# Patient Record
Sex: FEMALE | Race: BLACK OR AFRICAN AMERICAN
[De-identification: names, ages, dates, MRNs, and addresses within clinical notes are randomized per-mention and may not be internally consistent; named-entity substitution may affect disease eponyms.]

---

## 2017-07-28 ENCOUNTER — HOSPITAL ENCOUNTER (EMERGENCY)
Dept: HOSPITAL 61 - ER | Age: 37
Discharge: HOME | End: 2017-07-28
Payer: COMMERCIAL

## 2017-07-28 VITALS — SYSTOLIC BLOOD PRESSURE: 155 MMHG | DIASTOLIC BLOOD PRESSURE: 97 MMHG

## 2017-07-28 VITALS — WEIGHT: 240 LBS | HEIGHT: 66 IN | BODY MASS INDEX: 38.57 KG/M2

## 2017-07-28 DIAGNOSIS — K12.1: Primary | ICD-10-CM

## 2017-07-28 DIAGNOSIS — E78.00: ICD-10-CM

## 2017-07-28 DIAGNOSIS — F12.10: ICD-10-CM

## 2017-07-28 DIAGNOSIS — I10: ICD-10-CM

## 2017-07-28 DIAGNOSIS — G89.29: ICD-10-CM

## 2017-07-28 DIAGNOSIS — Z88.2: ICD-10-CM

## 2017-07-28 PROCEDURE — 99282 EMERGENCY DEPT VISIT SF MDM: CPT

## 2017-07-28 PROCEDURE — 99283 EMERGENCY DEPT VISIT LOW MDM: CPT

## 2017-07-28 NOTE — PHYS DOC
Past Medical History


Past Medical History:  No Pertinent History, High Cholesterol, Hypertension


Additional Past Medical Histor:  chronic back pain & chronic leg pain from 

mechanical falls


Past Surgical History:  No Surgical History


Alcohol Use:  Occasionally


Drug Use:  Marijuana





Adult General


Chief Complaint


Chief Complaint:  OTHER COMPLAINTS





[facility]





Patient is a 36  year old female with history of hypertension high cholesterol 

who presents today with soles on her thigh that began today. Patient denies any 

fever.





Review of Systems


Review of Systems





Constitutional: Denies fever or chills []


Eyes: Denies change in visual acuity, redness, or eye pain []


HENT: Sores on her tongue


Respiratory: Denies cough or shortness of breath []


Cardiovascular: No additional information not addressed in [facility] []


GI: Denies abdominal pain, nausea, vomiting, bloody stools or diarrhea []


: Denies dysuria or hematuria []


Musculoskeletal: Denies back pain or joint pain []


Integument: Denies rash or skin lesions []


Neurologic: Denies headache, focal weakness or sensory changes []


Endocrine: Denies polyuria or polydipsia []





Current Medications


Current Medications





Current Medications








 Medications


  (Trade)  Dose


 Ordered  Sig/Kurtis  Start Time


 Stop Time Status Last Admin


Dose Admin


 


 Lidocaine HCl


  (Viscous


 Lidocaine)  15 ml  1X  ONCE  7/28/17 22:15


 7/28/17 22:16 UNV  


 











Allergies


Allergies





Allergies








Coded Allergies Type Severity Reaction Last Updated Verified


 


  Sulfa (Sulfonamide Antibiotics) Allergy Unknown  12/27/16 Yes











Physical Exam


Physical Exam





Constitutional: Well developed, well nourished, no acute distress, non-toxic 

appearance. []


HENT: Normocephalic, atraumatic, bilateral external ears normal, oropharynx 

moist, no oral exudates, nose normal. []


tip of the tongue with small amount of stomatitis lesions


Eyes: PERRLA, EOMI, conjunctiva normal, no discharge. [] 


Neck: Normal range of motion, no tenderness, supple, no stridor. [] 


Cardiovascular:Heart rate regular rhythm, no murmur []


Lungs & Thorax:  Bilateral breath sounds clear to auscultation []


Abdomen: Bowel sounds normal, soft, no tenderness, no masses, no pulsatile 

masses. [] 


Skin: Warm, dry, no erythema, no rash. [] 


Back: No tenderness, no CVA tenderness. [] 


Extremities: No tenderness, no cyanosis, no clubbing, ROM intact, no edema. [] 


Neurologic: Alert and oriented X 3, normal motor function, normal sensory 

function, no focal deficits noted. []


Psychologic: Affect normal, judgement normal, mood normal. []





Current Patient Data


Vital Signs





 Vital Signs








  Date Time  Temp Pulse Resp B/P (MAP) Pulse Ox O2 Delivery O2 Flow Rate FiO2


 


7/28/17 22:05 97.8 87 16  100 Room Air  





 97.8       











EKG


EKG


[]





Radiology/Procedures


Radiology/Procedures


[]





Course & Med Decision Making


Course & Med Decision Making


Pertinent Labs and Imaging studies reviewed. (See chart for details)





Patient has oral stomatitis. Discharged in much mouthwash. Follow-up with 

primary care doctor in 1-2 weeks.





Dragon Disclaimer


Dragon Disclaimer


This electronic medical record was generated, in whole or in part, using a 

voice recognition dictation system.





Departure


Departure


Impression:  


 Primary Impression:  


 Stomatitis


Disposition:  01 HOME, SELF-CARE


Condition:  STABLE


Referrals:  


LUCIEN SWAN MD (PCP)


follow up with your doctor in 1 week


Patient Instructions:  Stomatitis, Easy-to-Read





Additional Instructions:  


You have stomatitis. Please use the medication provided as needed. Please 

follow up with your doctor in one week.











LAURA SOSA Jul 28, 2017 22:16

## 2019-06-24 ENCOUNTER — HOSPITAL ENCOUNTER (EMERGENCY)
Dept: HOSPITAL 61 - ER | Age: 39
Discharge: HOME | End: 2019-06-24
Payer: MEDICAID

## 2019-06-24 VITALS — HEIGHT: 66 IN | BODY MASS INDEX: 38.57 KG/M2 | WEIGHT: 240 LBS

## 2019-06-24 VITALS — DIASTOLIC BLOOD PRESSURE: 97 MMHG | SYSTOLIC BLOOD PRESSURE: 163 MMHG

## 2019-06-24 DIAGNOSIS — Z76.0: Primary | ICD-10-CM

## 2019-06-24 DIAGNOSIS — E78.00: ICD-10-CM

## 2019-06-24 DIAGNOSIS — Z88.2: ICD-10-CM

## 2019-06-24 DIAGNOSIS — I10: ICD-10-CM

## 2019-06-24 DIAGNOSIS — G89.29: ICD-10-CM

## 2019-06-24 PROCEDURE — 99283 EMERGENCY DEPT VISIT LOW MDM: CPT

## 2019-06-24 NOTE — PHYS DOC
Past Medical History


Past Medical History:  No Pertinent History, High Cholesterol, Hypertension


Additional Past Medical Histor:  chronic back pain & chronic leg pain from 

mechanical falls


Past Surgical History:  No Surgical History


Alcohol Use:  Occasionally


Drug Use:  Marijuana





Adult General


Chief Complaint


Chief Complaint:  LOWER EXTREMITY SWELLING





HPI


HPI





Patient is a 38  year old female presents to the ED for medication refill. 

Patient states she woke up today and had swelling to her body. States she drank 

alcohol last night. Patient has no swelling on exam. Patient has a history of 

hypertension and has not been taking her medication because she is out of it. 

States her insurance ran out and she has not been to the doctor in 2 months. 

Requesting medication refill. Denies chest pain, shortness of breath, weakness, 

orthopnea, abdominal pain, nausea/vomiting, fever, headache, dizziness.





Review of Systems


Review of Systems





Constitutional: Denies fever or chills []


Eyes: Denies change in visual acuity, redness, or eye pain []


HENT: Denies nasal congestion or sore throat []


Respiratory: Denies cough or shortness of breath []


Cardiovascular: No additional information not addressed in HPI []


GI: Denies abdominal pain, nausea, vomiting, bloody stools or diarrhea []


: Denies dysuria or hematuria []


Musculoskeletal: Denies back pain or joint pain []


Integument: Denies rash or skin lesions []


Neurologic: Denies headache, focal weakness or sensory changes []


Endocrine: Denies polyuria or polydipsia []





All other systems were reviewed and found to be within normal limits, except as 

documented in this note.





Allergies


Allergies





Allergies








Coded Allergies Type Severity Reaction Last Updated Verified


 


  Sulfa (Sulfonamide Antibiotics) Allergy Unknown  12/27/16 Yes











Physical Exam


Physical Exam





Constitutional: Well developed, well nourished, no acute distress, non-toxic 

appearance. []


HENT: Normocephalic, atraumatic


Eyes: PERRLA, EOMI, conjunctiva normal, no discharge. [] 


Neck: Normal range of motion, no tenderness, supple, no stridor. [] 


Cardiovascular:Heart rate regular rhythm, no murmur []


Lungs & Thorax:  Bilateral breath sounds clear to auscultation []


Abdomen: Bowel sounds normal, soft, no tenderness, no masses, no pulsatile 

masses. [] 


Skin: Warm, dry, no erythema, no rash. [] 


Back: No tenderness, no CVA tenderness. [] 


Extremities: No tenderness, no cyanosis, no clubbing, ROM intact, no edema. [] 


Neurologic: Alert and oriented X 3, normal motor function, normal sensory 

function, no focal deficits noted. []


Psychologic: Affect normal, judgement normal, mood normal. []





Current Patient Data


Vital Signs





                                   Vital Signs








  Date Time  Temp Pulse Resp B/P (MAP) Pulse Ox O2 Delivery O2 Flow Rate FiO2


 


6/24/19 08:48 98.2 87 20 163/97 (119) 99 Room Air  





 98.2       











EKG


EKG


[]





Radiology/Procedures


Radiology/Procedures


[]





Course & Med Decision Making


Course & Med Decision Making


Pertinent Labs and Imaging studies reviewed. (See chart for details)





[]Will restart patient on her home medications. Patient takes HCTZ 25 mg daily. 

Patient well appearing. No edema or swelling. No associated symptoms. Patient 

given appropriate follow-up with clinic outpatient. Discussed the importance of 

follow-up and reasons to return to the ED. Patient understands and agrees with 

plan.





Dragon Disclaimer


Dragon Disclaimer


This electronic medical record was generated, in whole or in part, using a voice

 recognition dictation system.





Departure


Departure


Impression:  


   Primary Impression:  


   Medication refill


Disposition:  01 HOME, SELF-CARE


Condition:  IMPROVED


Referrals:  


NO PCP (PCP)








DANISHA ZAMUDIO MD


Patient Instructions:  Medication Refill, Emergency Department


Scripts


Hydrochlorothiazide (HYDROCHLOROTHIAZIDE TABLET) 12.5 Mg Tablet


25 MG PO DAILY for DIURETIC for 14 Days, #14 TAB 0 Refills


   Prov: FRANCHESCA QUINONES         6/24/19











FRANCHESCA QUINONES        Jun 24, 2019 08:51

## 2020-03-02 ENCOUNTER — HOSPITAL ENCOUNTER (EMERGENCY)
Dept: HOSPITAL 61 - ER | Age: 40
Discharge: HOME | End: 2020-03-02
Payer: SELF-PAY

## 2020-03-02 VITALS — DIASTOLIC BLOOD PRESSURE: 74 MMHG | SYSTOLIC BLOOD PRESSURE: 127 MMHG

## 2020-03-02 VITALS — BODY MASS INDEX: 41.1 KG/M2 | WEIGHT: 255.74 LBS | HEIGHT: 66 IN

## 2020-03-02 DIAGNOSIS — F17.200: ICD-10-CM

## 2020-03-02 DIAGNOSIS — E78.00: ICD-10-CM

## 2020-03-02 DIAGNOSIS — I10: ICD-10-CM

## 2020-03-02 DIAGNOSIS — O03.4: Primary | ICD-10-CM

## 2020-03-02 DIAGNOSIS — G89.29: ICD-10-CM

## 2020-03-02 DIAGNOSIS — O34.81: ICD-10-CM

## 2020-03-02 DIAGNOSIS — E87.6: ICD-10-CM

## 2020-03-02 DIAGNOSIS — Z3A.01: ICD-10-CM

## 2020-03-02 DIAGNOSIS — Z88.2: ICD-10-CM

## 2020-03-02 DIAGNOSIS — O23.41: ICD-10-CM

## 2020-03-02 DIAGNOSIS — Z98.890: ICD-10-CM

## 2020-03-02 DIAGNOSIS — F12.90: ICD-10-CM

## 2020-03-02 DIAGNOSIS — N83.291: ICD-10-CM

## 2020-03-02 LAB
ALBUMIN SERPL-MCNC: 3.1 G/DL (ref 3.4–5)
ALBUMIN/GLOB SERPL: 1 {RATIO} (ref 1–1.7)
ALP SERPL-CCNC: 92 U/L (ref 46–116)
ALT SERPL-CCNC: 19 U/L (ref 14–59)
ANION GAP SERPL CALC-SCNC: 10 MMOL/L (ref 6–14)
APTT PPP: (no result) S
AST SERPL-CCNC: 14 U/L (ref 15–37)
BACTERIA #/AREA URNS HPF: 0 /HPF
BASOPHILS # BLD AUTO: 0.1 X10^3/UL (ref 0–0.2)
BASOPHILS NFR BLD: 2 % (ref 0–3)
BILIRUB SERPL-MCNC: 0.4 MG/DL (ref 0.2–1)
BILIRUB UR QL STRIP: (no result)
BUN SERPL-MCNC: 10 MG/DL (ref 7–20)
BUN/CREAT SERPL: 13 (ref 6–20)
CALCIUM SERPL-MCNC: 8.5 MG/DL (ref 8.5–10.1)
CHLORIDE SERPL-SCNC: 106 MMOL/L (ref 98–107)
CO2 SERPL-SCNC: 27 MMOL/L (ref 21–32)
CREAT SERPL-MCNC: 0.8 MG/DL (ref 0.6–1)
EOSINOPHIL NFR BLD: 0.2 X10^3/UL (ref 0–0.7)
EOSINOPHIL NFR BLD: 2 % (ref 0–3)
ERYTHROCYTE [DISTWIDTH] IN BLOOD BY AUTOMATED COUNT: 14.6 % (ref 11.5–14.5)
FIBRINOGEN PPP-MCNC: CLEAR MG/DL
GFR SERPLBLD BASED ON 1.73 SQ M-ARVRAT: 97.7 ML/MIN
GLOBULIN SER-MCNC: 3 G/DL (ref 2.2–3.8)
GLUCOSE SERPL-MCNC: 105 MG/DL (ref 70–99)
HCT VFR BLD CALC: 40.6 % (ref 36–47)
HGB BLD-MCNC: 13.3 G/DL (ref 12–15.5)
LYMPHOCYTES # BLD: 2.1 X10^3/UL (ref 1–4.8)
LYMPHOCYTES NFR BLD AUTO: 32 % (ref 24–48)
MCH RBC QN AUTO: 27 PG (ref 25–35)
MCHC RBC AUTO-ENTMCNC: 33 G/DL (ref 31–37)
MCV RBC AUTO: 83 FL (ref 79–100)
MONO #: 0.6 X10^3/UL (ref 0–1.1)
MONOCYTES NFR BLD: 9 % (ref 0–9)
NEUT #: 3.6 X10^3/UL (ref 1.8–7.7)
NEUTROPHILS NFR BLD AUTO: 54 % (ref 31–73)
NITRITE UR QL STRIP: NEGATIVE
PH UR STRIP: 6 [PH]
PLATELET # BLD AUTO: 160 X10^3/UL (ref 140–400)
POTASSIUM SERPL-SCNC: 3.1 MMOL/L (ref 3.5–5.1)
PROT SERPL-MCNC: 6.1 G/DL (ref 6.4–8.2)
PROT UR STRIP-MCNC: NEGATIVE MG/DL
RBC # BLD AUTO: 4.92 X10^6/UL (ref 3.5–5.4)
RBC #/AREA URNS HPF: >40 /HPF (ref 0–2)
SODIUM SERPL-SCNC: 143 MMOL/L (ref 136–145)
SQUAMOUS #/AREA URNS LPF: (no result) /LPF
U PREG PATIENT: POSITIVE
UROBILINOGEN UR-MCNC: 2 MG/DL
WBC # BLD AUTO: 6.6 X10^3/UL (ref 4–11)
WBC #/AREA URNS HPF: (no result) /HPF (ref 0–4)

## 2020-03-02 PROCEDURE — 81025 URINE PREGNANCY TEST: CPT

## 2020-03-02 PROCEDURE — 76801 OB US < 14 WKS SINGLE FETUS: CPT

## 2020-03-02 PROCEDURE — 87086 URINE CULTURE/COLONY COUNT: CPT

## 2020-03-02 PROCEDURE — 86901 BLOOD TYPING SEROLOGIC RH(D): CPT

## 2020-03-02 PROCEDURE — 81001 URINALYSIS AUTO W/SCOPE: CPT

## 2020-03-02 PROCEDURE — 84702 CHORIONIC GONADOTROPIN TEST: CPT

## 2020-03-02 PROCEDURE — 99285 EMERGENCY DEPT VISIT HI MDM: CPT

## 2020-03-02 PROCEDURE — 85025 COMPLETE CBC W/AUTO DIFF WBC: CPT

## 2020-03-02 PROCEDURE — 80053 COMPREHEN METABOLIC PANEL: CPT

## 2020-03-02 PROCEDURE — 36415 COLL VENOUS BLD VENIPUNCTURE: CPT

## 2020-03-02 PROCEDURE — 76817 TRANSVAGINAL US OBSTETRIC: CPT

## 2020-03-02 PROCEDURE — 86900 BLOOD TYPING SEROLOGIC ABO: CPT

## 2020-03-02 NOTE — PHYS DOC
Past Medical History


Past Medical History:  No Pertinent History, High Cholesterol, Hypertension


Additional Past Medical Histor:  chronic back pain & chronic leg pain from 

mechanical falls


Past Surgical History:  No Surgical History


Smoking Status:  Current Every Day Smoker


Alcohol Use:  None


Drug Use:  Marijuana





Adult General


Chief Complaint


Chief Complaint:  VAGINAL BLEEDING PREGNANCY





HPI


HPI





Patient is a 39  year old female who presents with vaginal bleeding that started

yesterday. The patient states that she saw a blood clot on the toilet. The 

patient also states she's been having abdominal cramping. She states her last 

menstrual. Was 4 weeks ago and that she is pregnant. The patient is a 

[id number]. She denies any nausea or fever.





Complete ROS were reviewed and found to be within normal limits, except as 

documented in the HPI





Current Medications


Current Medications





Current Medications








 Medications


  (Trade)  Dose


 Ordered  Sig/Kurtis  Start Time


 Stop Time Status Last Admin


Dose Admin


 


 Potassium Chloride


  (Klor-Con)  40 meq  1X  ONCE  3/2/20 15:00


 3/2/20 15:01 DC  














Allergies


Allergies





Allergies








Coded Allergies Type Severity Reaction Last Updated Verified


 


  Sulfa (Sulfonamide Antibiotics) Allergy Intermediate  3/2/20 Yes











Physical Exam


Physical Exam





Constitutional: Well developed, well nourished, no acute distress, non-toxic 

appearance. []


HENT: Normocephalic, atraumatic, bilateral external ears normal, oropharynx 

moist, no oral exudates, nose normal. []


Eyes: PERRLA, EOMI, conjunctiva normal, no discharge. [] 


Neck: Normal range of motion, no tenderness, supple, no stridor. [] 


Cardiovascular:Heart rate regular rhythm, no murmur []


Lungs & Thorax:  Bilateral breath sounds clear to auscultation []


Abdomen: Bowel sounds normal, soft, no tenderness, no masses, no pulsatile 

masses. [] 


Neurologic: Alert and oriented X 3, normal motor function, normal sensory 

function, no focal deficits noted. []


Psychologic: Affect normal, judgement normal, mood normal. []





Current Patient Data


Vital Signs





                                   Vital Signs








  Date Time  Temp Pulse Resp B/P (MAP) Pulse Ox O2 Delivery O2 Flow Rate FiO2


 


3/2/20 12:10 97.8 88 21 163/97 (119)  Room Air  





 97.8       








Lab Values





                                Laboratory Tests








Test


 3/2/20


12:20 3/2/20


13:34


 


Urine Collection Type Unknown   


 


Urine Color Meaghan   


 


Urine Clarity Clear   


 


Urine pH 6.0   


 


Urine Specific Gravity 1.025   


 


Urine Protein


 Negative mg/dL


(NEG-TRACE) 





 


Urine Glucose (UA)


 Negative mg/dL


(NEG) 





 


Urine Ketones (Stick)


 Trace mg/dL


(NEG) 





 


Urine Blood Large (NEG)   


 


Urine Nitrite


 Negative (NEG)


 





 


Urine Bilirubin Small (NEG)   


 


Urine Urobilinogen Dipstick


 2.0 mg/dL (0.2


mg/dL) 





 


Urine Leukocyte Esterase


 Moderate (NEG)


 





 


Urine RBC


 >40 /HPF (0-2)


 





 


Urine WBC


 1-4 /HPF (0-4)


 





 


Urine Squamous Epithelial


Cells Few /LPF  


 





 


Urine Bacteria


 0 /HPF (0-FEW)


 





 


Urine Mucus Slight /LPF   


 


Urine Pregnancy Test


 Positive (NEG)


 





 


White Blood Count


 


 6.6 x10^3/uL


(4.0-11.0)


 


Red Blood Count


 


 4.92 x10^6/uL


(3.50-5.40)


 


Hemoglobin


 


 13.3 g/dL


(12.0-15.5)


 


Hematocrit


 


 40.6 %


(36.0-47.0)


 


Mean Corpuscular Volume


 


 83 fL ()





 


Mean Corpuscular Hemoglobin  27 pg (25-35)  


 


Mean Corpuscular Hemoglobin


Concent 


 33 g/dL


(31-37)


 


Red Cell Distribution Width


 


 14.6 %


(11.5-14.5)  H


 


Platelet Count


 


 160 x10^3/uL


(140-400)


 


Neutrophils (%) (Auto)  54 % (31-73)  


 


Lymphocytes (%) (Auto)  32 % (24-48)  


 


Monocytes (%) (Auto)  9 % (0-9)  


 


Eosinophils (%) (Auto)  2 % (0-3)  


 


Basophils (%) (Auto)  2 % (0-3)  


 


Neutrophils # (Auto)


 


 3.6 x10^3/uL


(1.8-7.7)


 


Lymphocytes # (Auto)


 


 2.1 x10^3/uL


(1.0-4.8)


 


Monocytes # (Auto)


 


 0.6 x10^3/uL


(0.0-1.1)


 


Eosinophils # (Auto)


 


 0.2 x10^3/uL


(0.0-0.7)


 


Basophils # (Auto)


 


 0.1 x10^3/uL


(0.0-0.2)


 


Maternal Serum HCG Beta


Subunit 


 161 mIU/mL


(0-5)  H


 


Sodium Level


 


 143 mmol/L


(136-145)


 


Potassium Level


 


 3.1 mmol/L


(3.5-5.1)  L


 


Chloride Level


 


 106 mmol/L


()


 


Carbon Dioxide Level


 


 27 mmol/L


(21-32)


 


Anion Gap  10 (6-14)  


 


Blood Urea Nitrogen


 


 10 mg/dL


(7-20)


 


Creatinine


 


 0.8 mg/dL


(0.6-1.0)


 


Estimated GFR


(Cockcroft-Gault) 


 97.7  





 


BUN/Creatinine Ratio  13 (6-20)  


 


Glucose Level


 


 105 mg/dL


(70-99)  H


 


Calcium Level


 


 8.5 mg/dL


(8.5-10.1)


 


Total Bilirubin


 


 0.4 mg/dL


(0.2-1.0)


 


Aspartate Amino Transferase


(AST) 


 14 U/L (15-37)


L


 


Alanine Aminotransferase (ALT)


 


 19 U/L (14-59)





 


Alkaline Phosphatase


 


 92 U/L


()


 


Total Protein


 


 6.1 g/dL


(6.4-8.2)  L


 


Albumin


 


 3.1 g/dL


(3.4-5.0)  L


 


Albumin/Globulin Ratio  1.0 (1.0-1.7)  





                                Laboratory Tests


3/2/20 13:34








                                Laboratory Tests


3/2/20 13:34














EKG


EKG


[]





Radiology/Procedures


Radiology/Procedures


[][facility]


                    [address], [place] [id number] [phone]


                                        


                                 IMAGING REPORT





                                     Signed





PATIENT: ALBERT CARPIO   ACCOUNT: [id number]     MRN#: [id number]


: 1980           LOCATION: ER              AGE: 39


SEX: F                    EXAM DT: 20         ACCESSION#: [id number]


STATUS: REG ER            ORD. PHYSICIAN: DANISHA ANDERSON


REASON: vaginal bleeding


PROCEDURE: OB <14 WKS W/TV





Transabdominal and transvaginal sonography of the pelvis-OB ultrasound 


study less than 14 weeks.


 


Clinical indications: Pregnant. Vaginal bleeding.


 


Transabdominal sonography: The uterus is anteverted in position. 


Endometrial canal is thickened. No intrauterine gestational sac is seen. 


Therefore, transvaginal sonography will be performed. There is a prominent


cystic lesion of the right adnexa.


 


Transvaginal sonography: The endometrial canal measures 12 mm in 


thickness. No hyperemia of the endometrial canal is seen. No intrauterine 


gestational sac or fetus or fetal heartbeat is seen. Posterior to the 


uterus on the right side is a prominent right ovarian cyst. This cyst 


measures 5.7 cm in greatest dimension. Fine internal echoes are seen 


within it. The right ovary overall measures 5.9 cm and 8.3 cm and 4.4 cm 


in size. Color Doppler flow is seen within the right ovary. No hyperemia 


of this cyst is seen. The left ovary is normal and measures 3.1 cm and 3.5


cm and 2.6 cm in size. Color Doppler flow is seen within left ovary. No 


free fluid is seen around either ovary or within the cul-de-sac.


 


IMPRESSION: No intrauterine gestational sac is seen. Correlation with 


serial quantitative beta-hCG studies is needed.


 


5.7 cm cyst of the right ovary is seen. Mild internal echoes are seen 


which may represent debris or blood. No hyperemia of this cyst is seen. 


Color Doppler flow seen within the right ovary. Ultrasound follow-up in 3 


months for this cyst is recommended.


 


Electronically signed by: Yeny Hou MD (3/2/2020 2:22 PM) [facility] [place], [place]














DICTATED and SIGNED BY:     YENY HOU MD


DATE:     20 1422





Course & Med Decision Making


Course & Med Decision Making


Pertinent Labs and Imaging studies reviewed. (See chart for details)





Will get ultrasound, labs, and UA.





Beta HCG is 117. UA shows infection. Will treat with Keflex. Patient is likely 

having miscarriage will have follow up with OB. Patient is RH+ does not need 

RHOGAM. Will also have follow up on ovarian cyst.





Dragon Disclaimer


Dragon Disclaimer


This electronic medical record was generated, in whole or in part, using a voice

 recognition dictation system.





Departure


Departure


Impression:  


   Primary Impression:  


   Incomplete miscarriage


   Additional Impressions:  


   Hypokalemia


   Ovarian cyst


   Urinary tract infection


Disposition:  01 HOME, SELF-CARE


Condition:  STABLE


Referrals:  


NO PCP (PCP)








DANISHA JOHNSON MD


Patient Instructions:  Miscarriage





Additional Instructions:  


Thank you for visiting [facility]. We appreciate you trusting us 

with your care. If any additional problems come up don't hesitate to return to 

visit us. Please follow up with your primary care provider so they can plan 

additional care if needed and know about the problem that you had. If symptoms 

worsen come back to the Emergency Department. Any concerning symptoms that start

 such as chest pain, shortness of air, weakness or numbness on one side of the 

body, running high fevers or any other concerning symptoms return to the ER.





Please follow up with Dr. Johnson at OB/GYN. Please follow up with him this week 

to get another beta hCG level drawn for comparison.


Scripts


Cephalexin (KEFLEX) 500 Mg Capsule


1 CAP PO BID for 7 Days, #14 CAP 0 Refills


   Prov: DANISHA ANDERSON         3/2/20





Problem Qualifiers








   Additional Impressions:  


   Ovarian cyst


   Laterality:  right  Qualified Codes:  N83.201 - Unspecified ovarian cyst, 

   right side


   Urinary tract infection


   Urinary tract infection type:  acute cystitis  Hematuria presence:  with 

   hematuria  Qualified Codes:  N30.01 - Acute cystitis with hematuria








DANISHA ANDERSON           Mar 2, 2020 13:13

## 2020-04-19 ENCOUNTER — HOSPITAL ENCOUNTER (EMERGENCY)
Dept: HOSPITAL 61 - ER | Age: 40
Discharge: HOME | End: 2020-04-19
Payer: SELF-PAY

## 2020-04-19 VITALS — DIASTOLIC BLOOD PRESSURE: 115 MMHG | SYSTOLIC BLOOD PRESSURE: 155 MMHG

## 2020-04-19 VITALS — WEIGHT: 260.59 LBS | BODY MASS INDEX: 41.88 KG/M2 | HEIGHT: 66 IN

## 2020-04-19 DIAGNOSIS — G89.29: ICD-10-CM

## 2020-04-19 DIAGNOSIS — F12.90: ICD-10-CM

## 2020-04-19 DIAGNOSIS — I10: ICD-10-CM

## 2020-04-19 DIAGNOSIS — E66.01: ICD-10-CM

## 2020-04-19 DIAGNOSIS — N39.0: Primary | ICD-10-CM

## 2020-04-19 DIAGNOSIS — F17.210: ICD-10-CM

## 2020-04-19 DIAGNOSIS — R10.32: ICD-10-CM

## 2020-04-19 DIAGNOSIS — E78.00: ICD-10-CM

## 2020-04-19 DIAGNOSIS — Z88.2: ICD-10-CM

## 2020-04-19 LAB
ALBUMIN SERPL-MCNC: 3.7 G/DL (ref 3.4–5)
ALBUMIN/GLOB SERPL: 1.3 {RATIO} (ref 1–1.7)
ALP SERPL-CCNC: 124 U/L (ref 46–116)
ALT SERPL-CCNC: 23 U/L (ref 14–59)
AMPHETAMINE/METHAMPHETAMINE: (no result)
ANION GAP SERPL CALC-SCNC: 9 MMOL/L (ref 6–14)
APTT PPP: YELLOW S
AST SERPL-CCNC: 16 U/L (ref 15–37)
BACTERIA #/AREA URNS HPF: (no result) /HPF
BARBITURATES UR-MCNC: (no result) UG/ML
BASOPHILS # BLD AUTO: 0 X10^3/UL (ref 0–0.2)
BASOPHILS NFR BLD: 1 % (ref 0–3)
BENZODIAZ UR-MCNC: (no result) UG/L
BILIRUB SERPL-MCNC: 0.4 MG/DL (ref 0.2–1)
BILIRUB UR QL STRIP: NEGATIVE
BUN SERPL-MCNC: 14 MG/DL (ref 7–20)
BUN/CREAT SERPL: 18 (ref 6–20)
CALCIUM SERPL-MCNC: 8.9 MG/DL (ref 8.5–10.1)
CANNABINOIDS UR-MCNC: (no result) UG/L
CHLORIDE SERPL-SCNC: 106 MMOL/L (ref 98–107)
CO2 SERPL-SCNC: 28 MMOL/L (ref 21–32)
COCAINE UR-MCNC: (no result) NG/ML
CREAT SERPL-MCNC: 0.8 MG/DL (ref 0.6–1)
EOSINOPHIL NFR BLD: 0.1 X10^3/UL (ref 0–0.7)
EOSINOPHIL NFR BLD: 2 % (ref 0–3)
ERYTHROCYTE [DISTWIDTH] IN BLOOD BY AUTOMATED COUNT: 15 % (ref 11.5–14.5)
FIBRINOGEN PPP-MCNC: CLEAR MG/DL
GFR SERPLBLD BASED ON 1.73 SQ M-ARVRAT: 97.7 ML/MIN
GLOBULIN SER-MCNC: 2.9 G/DL (ref 2.2–3.8)
GLUCOSE SERPL-MCNC: 86 MG/DL (ref 70–99)
HCT VFR BLD CALC: 41.6 % (ref 36–47)
HGB BLD-MCNC: 13.4 G/DL (ref 12–15.5)
LYMPHOCYTES # BLD: 2.7 X10^3/UL (ref 1–4.8)
LYMPHOCYTES NFR BLD AUTO: 44 % (ref 24–48)
MCH RBC QN AUTO: 27 PG (ref 25–35)
MCHC RBC AUTO-ENTMCNC: 32 G/DL (ref 31–37)
MCV RBC AUTO: 83 FL (ref 79–100)
METHADONE SERPL-MCNC: (no result) NG/ML
MONO #: 0.5 X10^3/UL (ref 0–1.1)
MONOCYTES NFR BLD: 8 % (ref 0–9)
NEUT #: 2.9 X10^3/UL (ref 1.8–7.7)
NEUTROPHILS NFR BLD AUTO: 46 % (ref 31–73)
NITRITE UR QL STRIP: NEGATIVE
OPIATES UR-MCNC: (no result) NG/ML
PCP SERPL-MCNC: (no result) MG/DL
PH UR STRIP: 6.5 [PH]
PLATELET # BLD AUTO: 179 X10^3/UL (ref 140–400)
POTASSIUM SERPL-SCNC: 3.9 MMOL/L (ref 3.5–5.1)
PROT SERPL-MCNC: 6.6 G/DL (ref 6.4–8.2)
PROT UR STRIP-MCNC: NEGATIVE MG/DL
RBC # BLD AUTO: 5.01 X10^6/UL (ref 3.5–5.4)
RBC #/AREA URNS HPF: 0 /HPF (ref 0–2)
SODIUM SERPL-SCNC: 143 MMOL/L (ref 136–145)
SQUAMOUS #/AREA URNS LPF: (no result) /LPF
U PREG PATIENT: NEGATIVE
UROBILINOGEN UR-MCNC: 1 MG/DL
WBC # BLD AUTO: 6.2 X10^3/UL (ref 4–11)
WBC #/AREA URNS HPF: (no result) /HPF (ref 0–4)

## 2020-04-19 PROCEDURE — 85025 COMPLETE CBC W/AUTO DIFF WBC: CPT

## 2020-04-19 PROCEDURE — 80053 COMPREHEN METABOLIC PANEL: CPT

## 2020-04-19 PROCEDURE — 81001 URINALYSIS AUTO W/SCOPE: CPT

## 2020-04-19 PROCEDURE — 87086 URINE CULTURE/COLONY COUNT: CPT

## 2020-04-19 PROCEDURE — 81025 URINE PREGNANCY TEST: CPT

## 2020-04-19 PROCEDURE — 74176 CT ABD & PELVIS W/O CONTRAST: CPT

## 2020-04-19 PROCEDURE — 80307 DRUG TEST PRSMV CHEM ANLYZR: CPT

## 2020-04-19 PROCEDURE — 36415 COLL VENOUS BLD VENIPUNCTURE: CPT

## 2020-04-19 PROCEDURE — 99284 EMERGENCY DEPT VISIT MOD MDM: CPT

## 2020-04-19 NOTE — RAD
EXAM: CT Abdomen and Pelvis without IV contrast

 

INDICATION: Intermittent left lower quadrant abdominal pain.

 

TECHNIQUE: Multi-detector row CT images were acquired from the lung bases 

through the abdomen and pelvis without the use of IV contrast. Sagittal 

and coronal images were acquired from the transaxial data. All CT scans 

performed at this facility utilize dose optimization techniques as 

appropriate to the exam, including the following: Automated exposure 

control and adjustment of the mA and/or KV according to patient size (this

includes techniques or standardized protocols for targeted exams where 

dose is indication/reason for exam).

 

ORAL CONTRAST: None

 

COMPARISON: None

 

FINDINGS: 

 

The absence of IV contrast limits evaluation of soft tissue pathology.

 

LOWER CHEST: Unremarkable

 

LIVER:  Unremarkable

 

BILIARY SYSTEM: Gallbladder is unremarkable. Bile ducts are not dilated.

 

PANCREAS:  Unremarkable

 

SPLEEN:  Unremarkable

 

ADRENALS:  Unremarkable

 

KIDNEYS & URETERS:  Unremarkable

 

BLADDER:  Unremarkable

 

REPRODUCTIVE ORGANS:  Unremarkable

 

GASTROINTESTINAL: The stomach, small bowel, and colon are unremarkable. 

The appendix is not well seen but there are no findings of acute 

appendicitis.

 

MESENTERY/PERITONEUM/RETROPERITONEUM: Unremarkable

 

VASCULAR:  Unremarkable

 

LYMPH NODES:  No adenopathy

 

OSSEOUS & SOFT TISSUES:  Unremarkable

 

IMPRESSION:

 

Normal CT of the abdomen and pelvis without contrast.

 

Electronically signed by: Jack Knott MD (4/19/2020 7:23 AM) 

[id number]

## 2020-04-19 NOTE — PHYS DOC
Past Medical History


Past Medical History:  High Cholesterol, Hypertension


Additional Past Medical Histor:  chronic back pain & chronic leg pain from 

mechanical falls


Past Surgical History:  No Surgical History


Smoking Status:  Current Every Day Smoker


Alcohol Use:  None


Drug Use:  Marijuana





General Adult


EDM:


Chief Complaint:  ABDOMINAL PAIN





HPI:


HPI:





Patient is a 39  year old female with history of hypertension, dyslipidemia, 

chronic pain who presents with complaining of abdominal pain.  Patient states 

she had a miscarriage 2 weeks ago and 1 was not able to buy prescription of 

Keflex because of financial problem.  Patient complaining of left lower quadrant

pain since this morning felt she was getting ready to go to work as a 

intermittent pain that last for few seconds and repeated frequently without 

radiation, nausea and vomiting, urinary symptoms, fever and chills.  Patient 

denies history of the same pain.





Review of Systems:


Review of Systems:


Constitutional:  Denies fever or chills. []


Eyes:  Denies change in visual acuity. []


HENT:  Denies nasal congestion or sore throat. [] 


Respiratory:  Denies cough or shortness of breath. [] 


Cardiovascular:  Denies chest pain or edema. [] 


GI: Reports abdominal pain, denies nausea, vomiting, bloody stools or diarrhea. 

[] 


:  Denies dysuria. [] 


Musculoskeletal:  Denies back pain or joint pain. [] 


Integument:  Denies rash. [] 


Neurologic:  Denies headache, focal weakness or sensory changes. [] 


Endocrine:  Denies polyuria or polydipsia. [] 


Lymphatic:  Denies swollen glands. [] 


Psychiatric:  Denies depression or anxiety. []





Heart Score:


Risk Factors:


Risk Factors:  DM, Current or recent (<one month) smoker, HTN, HLP, family 

history of CAD, obesity.


Risk Scores:


Score 0 - 3:  2.5% MACE over next 6 weeks - Discharge Home


Score 4 - 6:  20.3% MACE over next 6 weeks - Admit for Clinical Observation


Score 7 - 10:  72.7% MACE over next 6 weeks - Early Invasive Strategies





Allergies:


Allergies:





Allergies








Coded Allergies Type Severity Reaction Last Updated Verified


 


  Sulfa (Sulfonamide Antibiotics) Allergy Intermediate  3/2/20 Yes











Physical Exam:


PE:





Constitutional: Well developed, well nourished, no acute distress, non-toxic 

appearance. []


HENT: Normocephalic, atraumatic, bilateral external ears normal, oropharynx 

moist, no oral exudates, nose normal. []


Eyes: PERRLA, EOMI, conjunctiva normal, no discharge. [] 


Neck: Normal range of motion, no tenderness, supple, no stridor. [] 


Cardiovascular:Heart rate regular rhythm, no murmur []


Lungs & Thorax:  Bilateral breath sounds clear to auscultation []


Abdomen: Bowel sounds normal, soft, no tenderness, no masses, no pulsatile 

masses. [] 


Skin: Warm, dry, no erythema, no rash. [] 


Back: No tenderness, no CVA tenderness. [] 


Extremities: No tenderness, no cyanosis, no clubbing, ROM intact, no edema. [] 


Neurologic: Alert and oriented X 3, normal motor function, normal sensory 

function, no focal deficits noted. []


Psychologic: Affect normal, judgement normal, mood normal. []





Current Patient Data:


Labs:





                                Laboratory Tests








Test


 20


05:55


 


White Blood Count


 6.2 x10^3/uL


(4.0-11.0)


 


Red Blood Count


 5.01 x10^6/uL


(3.50-5.40)


 


Hemoglobin


 13.4 g/dL


(12.0-15.5)


 


Hematocrit


 41.6 %


(36.0-47.0)


 


Mean Corpuscular Volume


 83 fL ()





 


Mean Corpuscular Hemoglobin 27 pg (25-35)  


 


Mean Corpuscular Hemoglobin


Concent 32 g/dL


(31-37)


 


Red Cell Distribution Width


 15.0 %


(11.5-14.5)  H


 


Platelet Count


 179 x10^3/uL


(140-400)


 


Neutrophils (%) (Auto) 46 % (31-73)  


 


Lymphocytes (%) (Auto) 44 % (24-48)  


 


Monocytes (%) (Auto) 8 % (0-9)  


 


Eosinophils (%) (Auto) 2 % (0-3)  


 


Basophils (%) (Auto) 1 % (0-3)  


 


Neutrophils # (Auto)


 2.9 x10^3/uL


(1.8-7.7)


 


Lymphocytes # (Auto)


 2.7 x10^3/uL


(1.0-4.8)


 


Monocytes # (Auto)


 0.5 x10^3/uL


(0.0-1.1)


 


Eosinophils # (Auto)


 0.1 x10^3/uL


(0.0-0.7)


 


Basophils # (Auto)


 0.0 x10^3/uL


(0.0-0.2)


 


Urine Collection Type Void  


 


Urine Color Yellow  


 


Urine Clarity Clear  


 


Urine pH


 6.5 (<5.0-8.0)





 


Urine Specific Gravity


 1.025


(1.000-1.030)


 


Urine Protein


 Negative mg/dL


(NEG-TRACE)


 


Urine Glucose (UA)


 Negative mg/dL


(NEG)


 


Urine Ketones (Stick)


 Negative mg/dL


(NEG)


 


Urine Blood


 Negative (NEG)





 


Urine Nitrite


 Negative (NEG)





 


Urine Bilirubin


 Negative (NEG)





 


Urine Urobilinogen Dipstick


 1.0 mg/dL (0.2


mg/dL)


 


Urine Leukocyte Esterase Small (NEG)  


 


Urine RBC 0 /HPF (0-2)  


 


Urine WBC


 20-40 /HPF


(0-4)


 


Urine Squamous Epithelial


Cells Many /LPF  





 


Urine Bacteria


 Few /HPF


(0-FEW)


 


Urine Mucus Marked /LPF  


 


Urine Pregnancy Test


 Negative (NEG)





 


Sodium Level


 143 mmol/L


(136-145)


 


Potassium Level


 3.9 mmol/L


(3.5-5.1)


 


Chloride Level


 106 mmol/L


()


 


Carbon Dioxide Level


 28 mmol/L


(21-32)


 


Anion Gap 9 (6-14)  


 


Blood Urea Nitrogen


 14 mg/dL


(7-20)


 


Creatinine


 0.8 mg/dL


(0.6-1.0)


 


Estimated GFR


(Cockcroft-Gault) 97.7  





 


BUN/Creatinine Ratio 18 (6-20)  


 


Glucose Level


 86 mg/dL


(70-99)


 


Calcium Level


 8.9 mg/dL


(8.5-10.1)


 


Total Bilirubin


 0.4 mg/dL


(0.2-1.0)


 


Aspartate Amino Transferase


(AST) 16 U/L (15-37)





 


Alanine Aminotransferase (ALT)


 23 U/L (14-59)





 


Alkaline Phosphatase


 124 U/L


()  H


 


Total Protein


 6.6 g/dL


(6.4-8.2)


 


Albumin


 3.7 g/dL


(3.4-5.0)


 


Albumin/Globulin Ratio 1.3 (1.0-1.7)  





                                Laboratory Tests


20 05:55








                                Laboratory Tests


20 05:55








Vital Signs:





                                   Vital Signs








  Date Time  Temp Pulse Resp B/P (MAP) Pulse Ox O2 Delivery O2 Flow Rate FiO2


 


20 05:50 98.1 99 14 158/100 (119) 99 Room Air  





 98.1       











EKG:


EKG:


[]





Radiology/Procedures:


Radiology/Procedures:


                            [facility]


                    [address] [place] [address]


                                 [phone]


                                        


                                 IMAGING REPORT





                                     Signed





PATIENT: ALBERT CARPIO   ACCOUNT: [id number]     MRN#: [id number]


: 1980           LOCATION: ER              AGE: 39


SEX: F                    EXAM DT: 20         ACCESSION#: [id number]


STATUS: REG ER            ORD. PHYSICIAN: RYAN STRINGER MD


REASON: Intermittent left lower quadrant pain


PROCEDURE: CT ABDOMEN PELVIS WO CONTRAST





EXAM: CT Abdomen and Pelvis without IV contrast


 


INDICATION: Intermittent left lower quadrant abdominal pain.


 


TECHNIQUE: Multi-detector row CT images were acquired from the lung bases 


through the abdomen and pelvis without the use of IV contrast. Sagittal 


and coronal images were acquired from the transaxial data. All CT scans 


performed at this facility utilize dose optimization techniques as 


appropriate to the exam, including the following: Automated exposure 


control and adjustment of the mA and/or KV according to patient size (this


includes techniques or standardized protocols for targeted exams where 


dose is indication/reason for exam).


 


ORAL CONTRAST: None


 


COMPARISON: None


 


FINDINGS: 


 


The absence of IV contrast limits evaluation of soft tissue pathology.


 


LOWER CHEST: Unremarkable


 


LIVER:  Unremarkable


 


BILIARY SYSTEM: Gallbladder is unremarkable. Bile ducts are not dilated.


 


PANCREAS:  Unremarkable


 


SPLEEN:  Unremarkable


 


ADRENALS:  Unremarkable


 


KIDNEYS & URETERS:  Unremarkable


 


BLADDER:  Unremarkable


 


REPRODUCTIVE ORGANS:  Unremarkable


 


GASTROINTESTINAL: The stomach, small bowel, and colon are unremarkable. 


The appendix is not well seen but there are no findings of acute 


appendicitis.


 


MESENTERY/PERITONEUM/RETROPERITONEUM: Unremarkable


 


VASCULAR:  Unremarkable


 


LYMPH NODES:  No adenopathy


 


OSSEOUS & SOFT TISSUES:  Unremarkable


 


IMPRESSION:


 


Normal CT of the abdomen and pelvis without contrast.


 


Electronically signed by: Ameya Beyer MD (2020 7:23 AM) 


[id number]














DICTATED and SIGNED BY:     AMEYA BEYER MD


DATE:     20 0723





Course & Med Decision Making:


Course & Med Decision Making


Pertinent Labs and Imaging studies reviewed. (See chart for details)








I've spoken with the patient and/or caregivers. I've explained the patient's 

condition, diagnosis and treatment plan based on information available to me at 

this time. I've answered the patient's and/or caregivers questions and addressed

 any concerns. The patient and/or caregivers have a good understanding the 

patient's diagnosis, condition and treatment plan as can be expected at this 

point. Vital signs have been stabilized. The patient's condition is stable for 

discharge from the emergency department.





The patient will pursue further outpatient evaluation with her primary care 

provider or other designated consulting physician as outlined in the discharge 

instructions. Patient and/or caregivers are agreeable to this plan of care and 

follow-up instructions have been explained in detail. The patient and/or 

caregivers have received these instructions in written format and expressed 

understanding of these discharge instructions. The patient and her caregivers 

are aware that if any significant change in condition or worsening of symptoms 

should prompt him to immediately return to this of the closest emergency 

department.  If an emergent department is not readily available I would 

encourage him to call 911.





Dragon Disclaimer:


Dragon Disclaimer:


This electronic medical record was generated, in whole or in part, using a voice

 recognition dictation system.





Departure


Departure


Impression:  


   Primary Impression:  


   Urinary tract infection


   Qualified Codes:  N39.0 - Urinary tract infection, site not specified


   Additional Impressions:  


   LLQ pain


   Morbid obesity


   PCP (phencyclidine) abuse


Disposition:   HOME, SELF-CARE


Condition:  STABLE


Referrals:  


NO PCP (PCP)


Patient Instructions:  Urinary Tract Infection





Additional Instructions:  


Drink plenty of liquids


Follow-up with your primary care physician in 3-5 days


Return to ER if not getting better





Thank you for visiting [facility]. We appreciate you trusting us 

with your care. If any additional problems come up don't hesitate to return to 

visit us. Please follow up with your primary care provider so they can plan 

additional care if needed and know about the problem that you had. If symptoms 

worsen come back to the Emergency Department. Any concerning symptoms that start

 such as chest pain, shortness of air, weakness or numbness on one side of the 

body, running high fevers or any other concerning symptoms return to the ER.


Scripts


Naproxen (NAPROSYN) 500 Mg Tablet


1 TAB PO BID for pain, #14 TAB


   Prov: RYAN STRINGER MD         20 


Ciprofloxacin Hcl (CIPRO) 250 Mg Tablet


1 TAB PO BID for infection, #14 TAB


   Prov: RYAN STRINGER MD         20











RYAN STRINGER MD             2020 07:41
